# Patient Record
Sex: FEMALE | Race: BLACK OR AFRICAN AMERICAN | Employment: FULL TIME | URBAN - METROPOLITAN AREA
[De-identification: names, ages, dates, MRNs, and addresses within clinical notes are randomized per-mention and may not be internally consistent; named-entity substitution may affect disease eponyms.]

---

## 2018-06-01 ENCOUNTER — HOSPITAL ENCOUNTER (EMERGENCY)
Age: 33
Discharge: HOME | End: 2018-06-01
Attending: EMERGENCY MEDICINE | Admitting: EMERGENCY MEDICINE
Payer: SELF-PAY

## 2018-06-01 ENCOUNTER — APPOINTMENT (OUTPATIENT)
Dept: ULTRASOUND IMAGING | Age: 33
End: 2018-06-01
Attending: EMERGENCY MEDICINE
Payer: SELF-PAY

## 2018-06-01 VITALS
OXYGEN SATURATION: 99 % | SYSTOLIC BLOOD PRESSURE: 116 MMHG | RESPIRATION RATE: 18 BRPM | HEART RATE: 65 BPM | DIASTOLIC BLOOD PRESSURE: 69 MMHG | WEIGHT: 186 LBS | BODY MASS INDEX: 25.19 KG/M2 | HEIGHT: 72 IN | TEMPERATURE: 98.5 F

## 2018-06-01 DIAGNOSIS — Z32.01 PREGNANCY TEST PERFORMED, PREGNANCY CONFIRMED: Primary | ICD-10-CM

## 2018-06-01 DIAGNOSIS — R10.2 PELVIC CRAMPING: ICD-10-CM

## 2018-06-01 LAB
ABO + RH BLD: NORMAL
ANION GAP SERPL CALC-SCNC: 7 MMOL/L (ref 3–18)
APPEARANCE UR: CLEAR
BASOPHILS # BLD: 0 K/UL (ref 0–0.06)
BASOPHILS NFR BLD: 0 % (ref 0–2)
BILIRUB UR QL: NEGATIVE
BUN SERPL-MCNC: 16 MG/DL (ref 7–18)
BUN/CREAT SERPL: 23 (ref 12–20)
C TRACH RRNA SPEC QL NAA+PROBE: NEGATIVE
CALCIUM SERPL-MCNC: 9.9 MG/DL (ref 8.5–10.1)
CHLORIDE SERPL-SCNC: 108 MMOL/L (ref 100–108)
CO2 SERPL-SCNC: 23 MMOL/L (ref 21–32)
COLOR UR: YELLOW
CREAT SERPL-MCNC: 0.71 MG/DL (ref 0.6–1.3)
DIFFERENTIAL METHOD BLD: ABNORMAL
EOSINOPHIL # BLD: 0.1 K/UL (ref 0–0.4)
EOSINOPHIL NFR BLD: 2 % (ref 0–5)
ERYTHROCYTE [DISTWIDTH] IN BLOOD BY AUTOMATED COUNT: 12.6 % (ref 11.6–14.5)
GLUCOSE SERPL-MCNC: 93 MG/DL (ref 74–99)
GLUCOSE UR STRIP.AUTO-MCNC: NEGATIVE MG/DL
HCG SERPL-ACNC: ABNORMAL MIU/ML (ref 0–10)
HCG UR QL: POSITIVE
HCT VFR BLD AUTO: 37.8 % (ref 35–45)
HGB BLD-MCNC: 12.2 G/DL (ref 12–16)
HGB UR QL STRIP: NEGATIVE
KETONES UR QL STRIP.AUTO: NEGATIVE MG/DL
LEUKOCYTE ESTERASE UR QL STRIP.AUTO: NEGATIVE
LYMPHOCYTES # BLD: 2.8 K/UL (ref 0.9–3.6)
LYMPHOCYTES NFR BLD: 47 % (ref 21–52)
MCH RBC QN AUTO: 29.9 PG (ref 24–34)
MCHC RBC AUTO-ENTMCNC: 32.3 G/DL (ref 31–37)
MCV RBC AUTO: 92.6 FL (ref 74–97)
MONOCYTES # BLD: 0.4 K/UL (ref 0.05–1.2)
MONOCYTES NFR BLD: 7 % (ref 3–10)
N GONORRHOEA RRNA SPEC QL NAA+PROBE: NEGATIVE
NEUTS SEG # BLD: 2.6 K/UL (ref 1.8–8)
NEUTS SEG NFR BLD: 44 % (ref 40–73)
NITRITE UR QL STRIP.AUTO: NEGATIVE
PH UR STRIP: 5.5 [PH] (ref 5–8)
PLATELET # BLD AUTO: 262 K/UL (ref 135–420)
PMV BLD AUTO: 10.7 FL (ref 9.2–11.8)
POTASSIUM SERPL-SCNC: 4.2 MMOL/L (ref 3.5–5.5)
PROT UR STRIP-MCNC: NEGATIVE MG/DL
RBC # BLD AUTO: 4.08 M/UL (ref 4.2–5.3)
SERVICE CMNT-IMP: NORMAL
SODIUM SERPL-SCNC: 138 MMOL/L (ref 136–145)
SP GR UR REFRACTOMETRY: 1.03 (ref 1–1.03)
SPECIMEN SOURCE: NORMAL
TSH SERPL DL<=0.05 MIU/L-ACNC: 1.04 UIU/ML (ref 0.36–3.74)
UROBILINOGEN UR QL STRIP.AUTO: 0.2 EU/DL (ref 0.2–1)
WBC # BLD AUTO: 6 K/UL (ref 4.6–13.2)
WET PREP GENITAL: NORMAL

## 2018-06-01 PROCEDURE — 81003 URINALYSIS AUTO W/O SCOPE: CPT

## 2018-06-01 PROCEDURE — 87491 CHLMYD TRACH DNA AMP PROBE: CPT

## 2018-06-01 PROCEDURE — 96360 HYDRATION IV INFUSION INIT: CPT

## 2018-06-01 PROCEDURE — 84702 CHORIONIC GONADOTROPIN TEST: CPT

## 2018-06-01 PROCEDURE — 74011250636 HC RX REV CODE- 250/636: Performed by: EMERGENCY MEDICINE

## 2018-06-01 PROCEDURE — 84443 ASSAY THYROID STIM HORMONE: CPT

## 2018-06-01 PROCEDURE — 81025 URINE PREGNANCY TEST: CPT

## 2018-06-01 PROCEDURE — 86900 BLOOD TYPING SEROLOGIC ABO: CPT

## 2018-06-01 PROCEDURE — 76817 TRANSVAGINAL US OBSTETRIC: CPT

## 2018-06-01 PROCEDURE — 99285 EMERGENCY DEPT VISIT HI MDM: CPT

## 2018-06-01 PROCEDURE — 80048 BASIC METABOLIC PNL TOTAL CA: CPT

## 2018-06-01 PROCEDURE — 85025 COMPLETE CBC W/AUTO DIFF WBC: CPT

## 2018-06-01 PROCEDURE — 87210 SMEAR WET MOUNT SALINE/INK: CPT

## 2018-06-01 RX ORDER — IBUPROFEN 400 MG/1
800 TABLET ORAL
Status: DISCONTINUED | OUTPATIENT
Start: 2018-06-01 | End: 2018-06-01

## 2018-06-01 RX ADMIN — SODIUM CHLORIDE 1000 ML: 900 INJECTION, SOLUTION INTRAVENOUS at 07:00

## 2018-06-01 NOTE — DISCHARGE INSTRUCTIONS
Your hormone level today was 82061. You need to have your level recheck on Monday 06/04/2018 at your [facility] office or in the ER. Belly Pain in Pregnancy: Care Instructions  Your Care Instructions    When you're pregnant, any belly pain can be a worry. You may not want to call your doctor about every pain you have. But you don't want to miss something that is dangerous for you or your baby. Even if it feels familiar, belly pain can mean something new when you're pregnant. It's important to know when to call your doctor. It will also help to know how to care for yourself at home when your pain is not caused by anything harmful. · When belly pain is more severe or constant, see a doctor right away. · If you're sure your belly pain is a sign of labor, call your doctor. · When belly pain is brief, it's usually a normal part of pregnancy. It might be related to changes in the growing uterus. Or it could be the stretching of ligaments called round ligaments. These ligaments help support the uterus. Round ligament pain can be on either side of your belly. It can also be felt in your hips or groin. Follow-up care is a key part of your treatment and safety. Be sure to make and go to all appointments, and call your doctor if you are having problems. It's also a good idea to know your test results and keep a list of the medicines you take. How can you tell if belly pain is a sign of labor? When belly pain is caused by labor, it can feel like mild or menstrual-like cramps in your lower belly. These cramps are probably contractions. They can happen in your second or third trimester. You may also have:  · A steady, dull ache in your lower back, pelvis, or thighs. · A feeling of pressure in your pelvis or lower belly. · Changes in your vaginal discharge or a sudden release of fluid from the vagina. If you think you are in labor, call your doctor. How can you care for yourself at home?   When belly pain is mild and is not a symptom of labor:  · Rest until you feel better. · Take a warm bath. · Think about what you drink and eat:  ¨ Drink plenty of fluids. Choose water and other caffeine-free clear liquids until you feel better. ¨ Try eating small, frequent meals. If your stomach is upset, try bland, low-fat foods like plain rice, broiled chicken, toast, and yogurt. · Think about how you move if you are having brief pains from stretching of the round ligaments. ¨ Try gentle stretching. ¨ Move a little more slowly when turning in bed or getting up from a chair, so those ligaments don't stretch quickly. ¨ Lean forward a bit if you think you are going to cough or sneeze. When should you call for help? Call 911 anytime you think you may need emergency care. For example, call if:  ? · You have sudden, severe pain in your belly. ? · You have severe vaginal bleeding. ?Call your doctor now or seek immediate medical care if:  ? · You have new or worse belly pain or cramping. ? · You have any vaginal bleeding. ? · You have a fever. ? · You have symptoms of preeclampsia, such as:  ¨ Sudden swelling of your face, hands, or feet. ¨ New vision problems (such as dimness or blurring). ¨ A severe headache. ? · You think that you may be in labor. This means that you've had at least 8 contractions within 1 hour or at least 4 contractions within 20 minutes, even after you change your position and drink fluids. ? · You have symptoms of a urinary tract infection. These may include:  ¨ Pain or burning when you urinate. ¨ A frequent need to urinate without being able to pass much urine. ¨ Pain in the flank, which is just below the rib cage and above the waist on either side of the back. ¨ Blood in your urine. ? Watch closely for changes in your health, and be sure to contact your doctor if you are worried about your or your baby's health. Where can you learn more? Go to http://annabel.info/.   Enter B275 in the search box to learn more about \"Belly Pain in Pregnancy: Care Instructions. \"  Current as of: March 16, 2017  Content Version: 11.4  © 7620-9989 [employer]. Care instructions adapted under license by [employer] (which disclaims liability or warranty for this information). If you have questions about a medical condition or this instruction, always ask your healthcare professional. [place] [address] any warranty or liability for your use of this information.

## 2018-06-01 NOTE — ED PROVIDER NOTES
EMERGENCY DEPARTMENT HISTORY AND PHYSICAL EXAM    6:27 AM      Date: 6/1/2018  Patient Name: Chasity Boyce    History of Presenting Illness     Chief Complaint   Patient presents with    Dizziness         History Provided By: Patient    Chief Complaint: Headache  Duration: 3 Days  Timing:  Acute  Location: Middle of head  Quality: Aching  Severity: Moderate  Modifying Factors: Patient did not report. Associated Symptoms: hot flashes, diaphoresis, abd cramps      Additional History (Context): 6:30 AM Chasity Boyce is a 34 y.o. female with no pertinent PMHx who presents to ED complaining of a moderate acute aching headache in the middle of head with onset 3 days ago with associated sx of hot flashes, diaphoresis, and abd cramps. Pt states she \"doesn't feel right\" and \"my body doesn't feel well. \" Denies congestion, pressure, sore throat, drainage, and cough. . States her period is irregular but her last period was APR 24th. Denies N/V/D and dysuria. 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 Admits to taking tylenol yesterday for the pain. Reports she takes no other medications. Denies any SHx. Denies smoking, illicit drug use, and ETOH use. Pt has no PCP but goes to the [facility] clinic. PCP: Ajay Reyna MD        Past History     Past Medical History:  History reviewed. No pertinent past medical history. Past Surgical History:  History reviewed. No pertinent surgical history. Family History:  History reviewed. No pertinent family history. Social History:  Social History   Substance Use Topics    Smoking status: Never Smoker    Smokeless tobacco: Never Used    Alcohol use No       Allergies:  No Known Allergies      Review of Systems     Review of Systems   Constitutional: Positive for diaphoresis. Negative for chills. Positive for hot flashes   HENT: Positive for congestion. Negative for postnasal drip, sinus pressure and sore throat.     Respiratory: Negative for cough and shortness of breath. Cardiovascular: Negative for chest pain. Gastrointestinal: Positive for abdominal pain ( cramps). Negative for diarrhea, nausea and vomiting. Genitourinary: Negative for dysuria and vaginal bleeding. Musculoskeletal: Negative for back pain. Skin: Negative for rash. Neurological: Positive for headaches. Negative for dizziness. All other systems reviewed and are negative. Physical Exam     Visit Vitals    /69 (BP 1 Location: Left arm, BP Patient Position: At rest)    Pulse 65    Temp 98.5 °F (36.9 °C)    Resp 18    Ht 6' 2\" (1.88 m)    Wt 84.4 kg (186 lb)    LMP 04/26/2018    SpO2 99%    Breastfeeding No    BMI 23.88 kg/m2       Physical Exam    General Exam: Patient is well developed and well nourished in no distress. Patient does not appear acutely ill or toxic. Eye Exam: Lids and conjunctiva are normal  ENT Exam: The general head and facial exam is normal.  The neck is supple without meningeal signs. No significant adenopathy. Pulmonary Exam: No respiratory distress. The respiratory rate is normal.  No stridor. The breath sounds are equal bilaterally. There are no wheezes, rales, or rhonchi noted. Cardiac Exam: The cardiac rate and rhythm are normal.  No significant murmurs, rubs, or gallops. The peripheral pulses are normal.  Abdominal Exam: Abdomen is soft and non-distended. No pulsatile masses. There is no local tenderness. There is no rebound or guarding noted. Skin and Soft Tissue: The skin is warm and dry, without significant abnormality. Good color. Musculoskeletal Exam: There is no clubbing or cyanosis. There is no peripheral edema. The musculoskeletal exam of the lower extremities is normal without significant local tenderness or spasm. Neurologic: Pt is alert and appropriate. Normal speech. Normal symmetric muscle strength and tone in all extremities. Normal gait.   Psychiatric: Normal adult with appropriate demeanor and interpersonal interaction. Is oriented to person, place, and time. : Chaperone was Alvarez. Normal external exam. Small amount of white discharge. No vaginal bleeding. No CMT or cervical tenderness. Diagnostic Study Results     Labs -  Recent Results (from the past 12 hour(s))   URINALYSIS W/ RFLX MICROSCOPIC    Collection Time: 06/01/18  6:30 AM   Result Value Ref Range    Color YELLOW      Appearance CLEAR      Specific gravity 1.029 1.005 - 1.030      pH (UA) 5.5 5.0 - 8.0      Protein NEGATIVE  NEG mg/dL    Glucose NEGATIVE  NEG mg/dL    Ketone NEGATIVE  NEG mg/dL    Bilirubin NEGATIVE  NEG      Blood NEGATIVE  NEG      Urobilinogen 0.2 0.2 - 1.0 EU/dL    Nitrites NEGATIVE  NEG      Leukocyte Esterase NEGATIVE  NEG     HCG URINE, QL. - POC    Collection Time: 06/01/18  6:31 AM   Result Value Ref Range    Pregnancy test,urine (POC) POSITIVE (A) NEG     CBC WITH AUTOMATED DIFF    Collection Time: 06/01/18  6:46 AM   Result Value Ref Range    WBC 6.0 4.6 - 13.2 K/uL    RBC 4.08 (L) 4.20 - 5.30 M/uL    HGB 12.2 12.0 - 16.0 g/dL    HCT 37.8 35.0 - 45.0 %    MCV 92.6 74.0 - 97.0 FL    MCH 29.9 24.0 - 34.0 PG    MCHC 32.3 31.0 - 37.0 g/dL    RDW 12.6 11.6 - 14.5 %    PLATELET 779 681 - 785 K/uL    MPV 10.7 9.2 - 11.8 FL    NEUTROPHILS 44 40 - 73 %    LYMPHOCYTES 47 21 - 52 %    MONOCYTES 7 3 - 10 %    EOSINOPHILS 2 0 - 5 %    BASOPHILS 0 0 - 2 %    ABS. NEUTROPHILS 2.6 1.8 - 8.0 K/UL    ABS. LYMPHOCYTES 2.8 0.9 - 3.6 K/UL    ABS. MONOCYTES 0.4 0.05 - 1.2 K/UL    ABS. EOSINOPHILS 0.1 0.0 - 0.4 K/UL    ABS.  BASOPHILS 0.0 0.0 - 0.06 K/UL    DF AUTOMATED     BLOOD TYPE, (ABO+RH)    Collection Time: 06/01/18  6:46 AM   Result Value Ref Range    ABO/Rh(D) A POSITIVE    METABOLIC PANEL, BASIC    Collection Time: 06/01/18  6:46 AM   Result Value Ref Range    Sodium 138 136 - 145 mmol/L    Potassium 4.2 3.5 - 5.5 mmol/L    Chloride 108 100 - 108 mmol/L    CO2 23 21 - 32 mmol/L    Anion gap 7 3.0 - 18 mmol/L    Glucose 93 74 - 99 mg/dL    BUN 16 7.0 - 18 MG/DL    Creatinine 0.71 0.6 - 1.3 MG/DL    BUN/Creatinine ratio 23 (H) 12 - 20      GFR est AA >60 >60 ml/min/1.73m2    GFR est non-AA >60 >60 ml/min/1.73m2    Calcium 9.9 8.5 - 10.1 MG/DL   BETA HCG, QT    Collection Time: 06/01/18  6:46 AM   Result Value [place] [address] (H) 0 - 10 MIU/ML   TSH 3RD GENERATION    Collection Time: 06/01/18  6:46 AM   Result Value Ref Range    TSH 1.04 0.36 - 3.74 uIU/mL   WET PREP    Collection Time: 06/01/18  8:05 AM   Result Value Ref Range    Special Requests: NO SPECIAL REQUESTS      Wet prep NO YEAST,TRICHOMONAS OR CLUE CELLS NOTED         Radiologic Studies -   US UTS TRANSVAGINAL OB   Final Result   IMPRESSION:     1. Somewhat elongated hypoechoic structure within the uterus, potentially  reflective of an early gestational sac; however, no fetal pole or yolk sac is  demonstrated. Would recommend continued clinical follow-up, trending of beta hCG  levels, and repeat ultrasound to document viable intrauterine pregnancy as  appropriate clinically.     2. Normal blood flow to each ovary.     3. Possible right ovarian corpus luteum.                  Medical Decision Making   I am the first provider for this patient. I reviewed the vital signs, available nursing notes, past medical history, past surgical history, family history and social history. Vital Signs-Reviewed the patient's vital signs. Pulse Oximetry Analysis -  99% on room air - stable    Records Reviewed: Nursing Notes and Old Medical Records (Time of Review: 6:27 AM)    ED Course: Progress Notes, Reevaluation, and Consults:  Consult:  Discussed care with Dr. Milan (OBGYN). Standard discussion; including history of patients chief complaint, available diagnostic results, and treatment course.  Reviewed the finding and in light of the pt's quant, feels that the pt is safe for outpatient follow up and can have a repeat blood test at the office Monday. 9:31 AM, 6/1/2018       Provider Notes (Medical Decision Making): Pt with vague complaints of not feeling well, exam reassuring with normal vitals. Reports of lower abd cramping yesterday. POC preg +. Labs and U/S obtained. Labs reviewed with no significant abnormalities. Pelvic with no tenderness and no exam finding to suggest ruptured ectopic. U/S with possible early gestational sac. Given level of quant, discussed with OB as fetal pole and yolk sac not yet seen. Pt to have close follow up with OB for repeat quant on Monday. Also advised to return to ER at anytime with any worsening or concerning symptoms. Pt ambulatory in ED and eager for discharge. Diagnosis     Clinical Impression:     ICD-10-CM ICD-9-CM   1. Pregnancy test performed, pregnancy confirmed Z32.01 V72.42   2. Pelvic cramping R10.2 625.9         Disposition: Discharged. Follow-up Information     None           Patient's Medications   Start Taking    No medications on file   Continue Taking    No medications on file   These Medications have changed    No medications on file   Stop Taking    METHOCARBAMOL (ROBAXIN-750) 750 MG TABLET    Take 1 Tab by mouth four (4) times daily as needed. _______________________________    Attestations:  Scribe Attestation     Carlos Waldron and Deb Mccann acting as a scribe for and in the presence of Юлия Black MD      June 01, 2018 at 6:27 AM       Provider Attestation:      I personally performed the services described in the documentation, reviewed the documentation, as recorded by the scribe in my presence, and it accurately and completely records my words and actions.  June 01, 2018 at 6:27 AM - Юлия Black MD    _______________________________

## 2018-06-01 NOTE — ED NOTES
Pt states she has been having off and on sweats, dizziness and headaches and states she has been getting very tired and SOB with exertion.   Pt also reports lower abdominal cramping

## 2018-06-01 NOTE — ED NOTES
Verbal shift change report given to Supriya Callahan RN (oncoming nurse) by Tej Bhatia (offgoing nurse). Report included the following information SBAR, ED Summary and MAR.

## 2018-06-05 ENCOUNTER — APPOINTMENT (OUTPATIENT)
Dept: ULTRASOUND IMAGING | Age: 33
End: 2018-06-05
Attending: EMERGENCY MEDICINE
Payer: SELF-PAY

## 2018-06-05 ENCOUNTER — HOSPITAL ENCOUNTER (EMERGENCY)
Age: 33
Discharge: HOME | End: 2018-06-05
Attending: EMERGENCY MEDICINE
Payer: SELF-PAY

## 2018-06-05 VITALS
HEART RATE: 65 BPM | TEMPERATURE: 98.1 F | OXYGEN SATURATION: 100 % | HEIGHT: 72 IN | RESPIRATION RATE: 16 BRPM | SYSTOLIC BLOOD PRESSURE: 128 MMHG | DIASTOLIC BLOOD PRESSURE: 78 MMHG | BODY MASS INDEX: 25.19 KG/M2 | WEIGHT: 186 LBS

## 2018-06-05 DIAGNOSIS — O26.899 PELVIC PAIN IN PREGNANCY: Primary | ICD-10-CM

## 2018-06-05 DIAGNOSIS — R10.9 ABDOMINAL PAIN COMPLICATING PREGNANCY: ICD-10-CM

## 2018-06-05 DIAGNOSIS — R10.2 PELVIC PAIN IN PREGNANCY: Primary | ICD-10-CM

## 2018-06-05 DIAGNOSIS — O26.899 ABDOMINAL PAIN COMPLICATING PREGNANCY: ICD-10-CM

## 2018-06-05 LAB
ALBUMIN SERPL-MCNC: 3.7 G/DL (ref 3.4–5)
ALBUMIN/GLOB SERPL: 1.2 {RATIO} (ref 0.8–1.7)
ALP SERPL-CCNC: 47 U/L (ref 45–117)
ALT SERPL-CCNC: 19 U/L (ref 13–56)
ANION GAP SERPL CALC-SCNC: 7 MMOL/L (ref 3–18)
APPEARANCE UR: CLEAR
AST SERPL-CCNC: 14 U/L (ref 15–37)
BASOPHILS # BLD: 0 K/UL (ref 0–0.06)
BASOPHILS NFR BLD: 0 % (ref 0–2)
BILIRUB DIRECT SERPL-MCNC: 0.1 MG/DL (ref 0–0.2)
BILIRUB SERPL-MCNC: 0.2 MG/DL (ref 0.2–1)
BILIRUB UR QL: NEGATIVE
BUN SERPL-MCNC: 17 MG/DL (ref 7–18)
BUN/CREAT SERPL: 25 (ref 12–20)
CALCIUM SERPL-MCNC: 9.6 MG/DL (ref 8.5–10.1)
CHLORIDE SERPL-SCNC: 108 MMOL/L (ref 100–108)
CO2 SERPL-SCNC: 24 MMOL/L (ref 21–32)
COLOR UR: YELLOW
CREAT SERPL-MCNC: 0.69 MG/DL (ref 0.6–1.3)
DIFFERENTIAL METHOD BLD: ABNORMAL
EOSINOPHIL # BLD: 0.1 K/UL (ref 0–0.4)
EOSINOPHIL NFR BLD: 2 % (ref 0–5)
ERYTHROCYTE [DISTWIDTH] IN BLOOD BY AUTOMATED COUNT: 12.1 % (ref 11.6–14.5)
GLOBULIN SER CALC-MCNC: 3.2 G/DL (ref 2–4)
GLUCOSE SERPL-MCNC: 86 MG/DL (ref 74–99)
GLUCOSE UR STRIP.AUTO-MCNC: NEGATIVE MG/DL
HCG SERPL-ACNC: ABNORMAL MIU/ML (ref 0–10)
HCT VFR BLD AUTO: 34.8 % (ref 35–45)
HGB BLD-MCNC: 11.3 G/DL (ref 12–16)
HGB UR QL STRIP: NEGATIVE
KETONES UR QL STRIP.AUTO: NEGATIVE MG/DL
LEUKOCYTE ESTERASE UR QL STRIP.AUTO: NEGATIVE
LIPASE SERPL-CCNC: 189 U/L (ref 73–393)
LYMPHOCYTES # BLD: 1.8 K/UL (ref 0.9–3.6)
LYMPHOCYTES NFR BLD: 39 % (ref 21–52)
MCH RBC QN AUTO: 29.6 PG (ref 24–34)
MCHC RBC AUTO-ENTMCNC: 32.5 G/DL (ref 31–37)
MCV RBC AUTO: 91.1 FL (ref 74–97)
MONOCYTES # BLD: 0.5 K/UL (ref 0.05–1.2)
MONOCYTES NFR BLD: 10 % (ref 3–10)
NEUTS SEG # BLD: 2.3 K/UL (ref 1.8–8)
NEUTS SEG NFR BLD: 49 % (ref 40–73)
NITRITE UR QL STRIP.AUTO: NEGATIVE
PH UR STRIP: 5 [PH] (ref 5–8)
PLATELET # BLD AUTO: 262 K/UL (ref 135–420)
PMV BLD AUTO: 11 FL (ref 9.2–11.8)
POTASSIUM SERPL-SCNC: 4.3 MMOL/L (ref 3.5–5.5)
PROT SERPL-MCNC: 6.9 G/DL (ref 6.4–8.2)
PROT UR STRIP-MCNC: NEGATIVE MG/DL
RBC # BLD AUTO: 3.82 M/UL (ref 4.2–5.3)
SODIUM SERPL-SCNC: 139 MMOL/L (ref 136–145)
SP GR UR REFRACTOMETRY: >1.03 (ref 1–1.03)
UROBILINOGEN UR QL STRIP.AUTO: 0.2 EU/DL (ref 0.2–1)
WBC # BLD AUTO: 4.7 K/UL (ref 4.6–13.2)

## 2018-06-05 PROCEDURE — 80076 HEPATIC FUNCTION PANEL: CPT

## 2018-06-05 PROCEDURE — 81003 URINALYSIS AUTO W/O SCOPE: CPT

## 2018-06-05 PROCEDURE — 76817 TRANSVAGINAL US OBSTETRIC: CPT

## 2018-06-05 PROCEDURE — 80048 BASIC METABOLIC PNL TOTAL CA: CPT

## 2018-06-05 PROCEDURE — 99283 EMERGENCY DEPT VISIT LOW MDM: CPT

## 2018-06-05 PROCEDURE — 84702 CHORIONIC GONADOTROPIN TEST: CPT

## 2018-06-05 PROCEDURE — 85025 COMPLETE CBC W/AUTO DIFF WBC: CPT

## 2018-06-05 PROCEDURE — 83690 ASSAY OF LIPASE: CPT

## 2018-06-05 NOTE — ED NOTES
Vitals:  Patient Vitals for the past 12 hrs:   Temp Pulse Resp BP SpO2   06/05/18 1945 - - - 128/78 100 %   06/05/18 1844 - 65 16 121/76 100 %   06/05/18 1729 98.1 °F (36.7 °C) 63 16 125/73 100 %         Medications ordered:   Medications - No data to display      Lab findings:  Recent Results (from the past 12 hour(s))   URINALYSIS W/ RFLX MICROSCOPIC    Collection Time: 06/05/18  5:26 PM   Result Value Ref Range    Color YELLOW      Appearance CLEAR      Specific gravity >1.030 (H) 1.005 - 1.030    pH (UA) 5.0 5.0 - 8.0      Protein NEGATIVE  NEG mg/dL    Glucose NEGATIVE  NEG mg/dL    Ketone NEGATIVE  NEG mg/dL    Bilirubin NEGATIVE  NEG      Blood NEGATIVE  NEG      Urobilinogen 0.2 0.2 - 1.0 EU/dL    Nitrites NEGATIVE  NEG      Leukocyte Esterase NEGATIVE  NEG     BETA HCG, QT    Collection Time: 06/05/18  6:30 PM   Result Value Ref [place] [address] (H) 0 - 10 MIU/ML   CBC WITH AUTOMATED DIFF    Collection Time: 06/05/18  6:30 PM   Result Value Ref Range    WBC 4.7 4.6 - 13.2 K/uL    RBC 3.82 (L) 4.20 - 5.30 M/uL    HGB 11.3 (L) 12.0 - 16.0 g/dL    HCT 34.8 (L) 35.0 - 45.0 %    MCV 91.1 74.0 - 97.0 FL    MCH 29.6 24.0 - 34.0 PG    MCHC 32.5 31.0 - 37.0 g/dL    RDW 12.1 11.6 - 14.5 %    PLATELET 269 571 - 964 K/uL    MPV 11.0 9.2 - 11.8 FL    NEUTROPHILS 49 40 - 73 %    LYMPHOCYTES 39 21 - 52 %    MONOCYTES 10 3 - 10 %    EOSINOPHILS 2 0 - 5 %    BASOPHILS 0 0 - 2 %    ABS. NEUTROPHILS 2.3 1.8 - 8.0 K/UL    ABS. LYMPHOCYTES 1.8 0.9 - 3.6 K/UL    ABS. MONOCYTES 0.5 0.05 - 1.2 K/UL    ABS. EOSINOPHILS 0.1 0.0 - 0.4 K/UL    ABS.  BASOPHILS 0.0 0.0 - 0.06 K/UL    DF AUTOMATED     METABOLIC PANEL, BASIC    Collection Time: 06/05/18  6:30 PM   Result Value Ref Range    Sodium 139 136 - 145 mmol/L    Potassium 4.3 3.5 - 5.5 mmol/L    Chloride 108 100 - 108 mmol/L    CO2 24 21 - 32 mmol/L    Anion gap 7 3.0 - 18 mmol/L    Glucose 86 74 - 99 mg/dL    BUN 17 7.0 - 18 MG/DL    Creatinine 0.69 0.6 - 1.3 MG/DL BUN/Creatinine ratio 25 (H) 12 - 20      GFR est AA >60 >60 ml/min/1.73m2    GFR est non-AA >60 >60 ml/min/1.73m2    Calcium 9.6 8.5 - 10.1 MG/DL   HEPATIC FUNCTION PANEL    Collection Time: 06/05/18  6:30 PM   Result Value Ref Range    Protein, total 6.9 6.4 - 8.2 g/dL    Albumin 3.7 3.4 - 5.0 g/dL    Globulin 3.2 2.0 - 4.0 g/dL    A-G Ratio 1.2 0.8 - 1.7      Bilirubin, total 0.2 0.2 - 1.0 MG/DL    Bilirubin, direct 0.1 0.0 - 0.2 MG/DL    Alk. phosphatase 47 45 - 117 U/L    AST (SGOT) 14 (L) 15 - 37 U/L    ALT (SGPT) 19 13 - 56 U/L   LIPASE    Collection Time: 06/05/18  6:30 PM   Result Value Ref Range    Lipase 189 73 - 393 U/L       EKG interpretation by ED Physician:      X-Ray, CT or other radiology findings or impressions:  US UTS TRANSVAGINAL OB    (Results Pending)   now with yolk sac c/w previous; no fetal pole    Progress notes, Consult notes or additional Procedure notes:   7:18 PM :Pt care assumed from Dr. Janis Read , ED provider. Pt complaint(s), current treatment plan, progression and available diagnostic results have been discussed thoroughly. Rounding occurred: yes  Intended Disposition: Home   Pending diagnostic reports and/or labs (please list): us  Increased quant and now has fetal pole; c/w iup, appropriate pregnancy progression  I have discussed with patient and/or family/sig other the results, interpretation of any imaging if performed, suspected diagnosis and treatment plan to include instructions regarding the diagnoses listed to which understanding was expressed with all questions answered      Reevaluation of patient:   stable    Disposition:  Diagnosis:   1. Pelvic pain in pregnancy    2.  Abdominal pain complicating pregnancy        Disposition: home    Follow-up Information     Follow up With Details Comments [address] DO Rodri Schedule an appointment as soon as possible for a visit  [address] EMERGENCY DEPT  If symptoms worsen [address]  [phone]            Discharge Medication List as of 6/5/2018  7:50 PM      CONTINUE these medications which have NOT CHANGED    Details   prenatal multivit-ca-min-fe-fa (PRENATAL VITAMIN) tab Take 1 Tab by mouth daily. , Print, Disp-30 Tab, R-0

## 2018-06-05 NOTE — ED PROVIDER NOTES
EMERGENCY DEPARTMENT HISTORY AND PHYSICAL EXAM    6:09 PM      Date: 6/5/2018  Patient Name: Best Greene    History of Presenting Illness     Chief Complaint   Patient presents with    Pregnancy Problem       History Provided By: Patient    Chief Complaint: Abdominal Pain   Duration:  1 day ago  Timing:  Constant  Location: Epigastric abdomen   Quality: Cramping and Sharp  Severity: 8 out of 10  Modifying Factors: None   Associated Symptoms: An associated symptom includes back pain. Patient denies other associated symptoms, such as vaginal bleeding, N/V/D, CP, and SOB. Additional History (Context): Best Greene is a 34 y.o. female with No significant past medical history who presents with sharp, cramping epigastric abdominal pain onset 1 day ago. An associated symptom includes back pain. Patient denies other associated symptoms, such as vaginal bleeding, N/V/D, CP, and SOB. Patient states that she was seen here on 6/1/18. Patient had a positive pregnancy test. Patient's ultrasound showed a gestational sac, but was unable to visualize other signs of pregnancy. The recommendation was to follow-up with the OB/GYN for a repeat hormone level. Patient denies following up with the OB/GYN secondary to insurance reasons. Patient reports currently taking Prenatal vitamins. Reports LNMP: 4/26/18. No other concerns were expressed at this time. Blood: A +    PCP: Ajay Reyna MD    Current Outpatient Prescriptions   Medication Sig Dispense Refill    prenatal multivit-ca-min-fe-fa (PRENATAL VITAMIN) tab Take 1 Tab by mouth daily. 30 Tab 0       Past History     Past Medical History:  History reviewed. No pertinent past medical history. Past Surgical History:  History reviewed. No pertinent surgical history. Family History:  History reviewed. No pertinent family history.     Social History:  Social History   Substance Use Topics    Smoking status: Never Smoker    Smokeless tobacco: Never Used    Alcohol use No       Allergies:  No Known Allergies      Review of Systems       Review of Systems   Constitutional: Negative for chills. HENT: Negative for congestion and sore throat. Respiratory: Negative for cough and shortness of breath. Cardiovascular: Negative for chest pain. Gastrointestinal: Positive for abdominal pain. Negative for diarrhea, nausea and vomiting. Genitourinary: Negative for dysuria and vaginal bleeding. Musculoskeletal: Positive for back pain. Skin: Negative for rash. Neurological: Negative for dizziness and headaches. All other systems reviewed and are negative. Physical Exam     Visit Vitals    /76    Pulse 65    Temp 98.1 °F (36.7 °C)    Resp 16    Ht 6' 2\" (1.88 m)    Wt 84.4 kg (186 lb)    LMP 04/26/2018    SpO2 100%    BMI 23.88 kg/m2       Physical Exam  General Exam: Patient is a well developed and well nourished in no distress. Patient does not appear acutely ill or toxic. Eye Exam: Lids and conjunctiva are normal  ENT Exam: The general head and facial exam is normal.  The neck is supple without meningeal signs. No significant adenopathy. Pulmonary Exam: No respiratory distress. The respiratory rate is normal.  No stridor. The breath sounds are equal bilaterally. There are no wheezes, rales, or rhonchi noted. Cardiac Exam: The cardiac rate and rhythm are normal.  No significant murmurs, rubs, or gallops. The peripheral pulses are normal.  Abdominal Exam: Abdomen is soft and non-distended. No pulsatile masses. There is mild mid-abdominal tenderness. There is no rebound or guarding noted. Skin and Soft Tissue: The skin is warm and dry, without significant abnormality. Good color. Musculoskeletal Exam: No peripheral edema. The musculoskeletal exam of the lower extremities is normal without significant local tenderness. Psychiatric: Normal adult with appropriate demeanor and interpersonal interaction.   Is oriented to person, place, and time.      Diagnostic Study Results     Labs -  No results found for this or any previous visit (from the past 12 hour(s)). Radiologic Studies -   US UTS TRANSVAGINAL OB    (Results Pending)         Medical Decision Making   I am the first provider for this patient. I reviewed the vital signs, available nursing notes, past medical history, past surgical history, family history and social history. Vital Signs-Reviewed the patient's vital signs. Pulse Oximetry Analysis -  100% on room air (Interpretation)    Records Reviewed: Nursing Notes, Old Medical Records and Triage notes  (Time of Review: 6:09 PM)    ED Course: Progress Notes, Reevaluation, and Consults:  7:00 PM : Pt care transferred to Dr. Dipti Grey  ,ED provider. History of patient complaint(s), available diagnostic reports and current treatment plan has been discussed thoroughly. Bedside rounding on patient occured : yes . Intended disposition of patient : TBD  Pending diagnostics reports and/or labs (please list): Pending Labs and Ultrasound Results     Provider Notes (Medical Decision Making): Pt with abdominal cramping in early pregnancy. Abdomen soft without peritoneal signs. Resting comfortably in ED. Repeat labs obtained including hcg quant (pending). Plan for repeat U/S given lack of fetal pole/yolk sac seen on previous U/S. Labs and U/S pending. Pt to be signed out to Dr. Dipti Grey. Diagnosis     Clinical Impression:     ICD-10-CM ICD-9-CM   1. Pelvic pain in pregnancy O26.899 646.80    R10.2 625.9         Disposition: Signed out to Dr. Dipti Grey, ED Provider    Follow-up Information     None           Patient's Medications   Start Taking    No medications on file   Continue Taking    PRENATAL MULTIVIT-CA-MIN-FE-FA (PRENATAL VITAMIN) TAB    Take 1 Tab by mouth daily.    These Medications have changed    No medications on file   Stop Taking    No medications on file     _______________________________    Attestations:  Varsha Attestation     [employer] acting as a scribe for and in the presence of Emanuel Puckett MD      June 05, 2018 at 6:09 PM       Provider Attestation:      I personally performed the services described in the documentation, reviewed the documentation, as recorded by the scribe in my presence, and it accurately and completely records my words and actions.  June 05, 2018 at 6:09 PM - Emanuel Puckett MD    _______________________________

## 2018-06-05 NOTE — ED TRIAGE NOTES
Seen here 2 days ago. Cramping. Found was 5 weeks pregnant. Says waiting for medicaid so can go to OB.

## 2018-09-17 ENCOUNTER — HOSPITAL ENCOUNTER (EMERGENCY)
Age: 33
Discharge: HOME | End: 2018-09-17
Attending: EMERGENCY MEDICINE
Payer: MEDICAID

## 2018-09-17 ENCOUNTER — HOSPITAL ENCOUNTER (OUTPATIENT)
Age: 33
Discharge: HOME | End: 2018-09-17
Attending: OBSTETRICS & GYNECOLOGY | Admitting: OBSTETRICS & GYNECOLOGY
Payer: MEDICAID

## 2018-09-17 VITALS
OXYGEN SATURATION: 100 % | RESPIRATION RATE: 18 BRPM | HEART RATE: 85 BPM | SYSTOLIC BLOOD PRESSURE: 142 MMHG | DIASTOLIC BLOOD PRESSURE: 82 MMHG | TEMPERATURE: 98.5 F

## 2018-09-17 VITALS — HEIGHT: 72 IN | BODY MASS INDEX: 25.19 KG/M2 | WEIGHT: 186 LBS | TEMPERATURE: 98.8 F

## 2018-09-17 DIAGNOSIS — Z01.419 WELL WOMAN EXAM: Primary | ICD-10-CM
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
## 2018-09-17 PROBLEM — Z34.90 PREGNANCY: Status: ACTIVE | Noted: 2018-09-17

## 2018-09-17 PROCEDURE — 99282 EMERGENCY DEPT VISIT SF MDM: CPT

## 2018-09-17 PROCEDURE — 99281 EMR DPT VST MAYX REQ PHY/QHP: CPT

## 2018-09-17 PROCEDURE — 99218 HC RM OBSERVATION: CPT

## 2018-09-17 NOTE — PROGRESS NOTES
21 weeks pregnant, 34 y.o. female with no pertinent pmhx seen and discharged from  ED for c/o evaluation of moderate acute fall down 9 steps. Pt feels okay currently and she is 21 weeks pregnant. Patient  Denies vaginal  bleeding or discharge, reports feeling some pain in lower midline abd. Patient placed on TOCO monitor for uterine activity, fetal heart tones obtained via doppler, . Patient oriented to room, given juice, call bell in place. [phone] Paged Dr Jayson Snellen [phone] received return call from Dr. Jayson Snellen advised to monitor patient until able to assess. Ifrah to Dr. Jayson Snellen via telephone, patient cleared to be discharged home follow up with regular OB provider.

## 2018-09-17 NOTE — PROGRESS NOTES
Labor and delivery screening visit Phone Triage Visit Name: Venessa Roe MRN: [id number]  SSN: [id number] YOB: 1985  Age: 34 y.o. Sex: female Reji Westbrook is a 34 y.o.  female who is due 2019, by Last Menstrual Period. She is  20w4d. She  is being seen for s/p fall down 9 steps at work today. She was cleared by the ED and sent to L&D for monitoring. Pt. Has been monitored for 2 hours. She denies any pain, bleeding or cramping. She denies any abdominal trauma and fell on her bottom and hands. Chief Complaint Patient presents with  Fall OB History  Para Term  AB Living 4 1  1 SAB TAB Ectopic Molar Multiple Live Births # Outcome Date GA Lbr Juan Antonio/2nd Weight Sex Delivery Anes PTL Lv  
4 Current           
3  08/11/15   6 lb (2.722 kg) F  None 2  07/16/10   4 lb 10 oz (2.098 kg) M Vag-Spont EPI N   
1  08   6 lb 11 oz (3.033 kg) M Vag-Spont EPI N She is being seen in screening location [facility] Patient denies LOF/VB/DFM Additional Diagnoses: Present on Admission: **None** Historical Additional  Problem List.:  
Problem List as of 2018  Never Reviewed Codes Class Noted - Resolved Pregnancy ICD-10-CM: Z34.90 ICD-9-CM: V22.2  2018 - Present No Known Allergies No past medical history on file. Prior to Admission medications Medication Sig Start Date End Date Taking? Authorizing Provider  
prenatal multivit-ca-min-fe-fa (PRENATAL VITAMIN) tab Take 1 Tab by mouth daily. 18  Yes Herrera Lehman MD  
  
 
Objective: 
Visit Vitals  Temp 98.8 °F (37.1 °C)  Ht 6' 2\" (1.88 m)  Wt 186 lb (84.4 kg)  BMI 23.88 kg/m2 Prenatal Labs:  
No results found for: RUBELLAEXT, GRBSEXT, HBSAGEXT, HIVEXT, RPREXT, GONNOEXT, CHLAMEXT 
 
WBC Date/Time Value Ref Range Status 2018 06:30 PM 4.7 4.6 - 13.2 K/uL Final  
 2018 06:46 AM 6.0 4.6 - 13.2 K/uL Final  
 
HGB Date/Time Value Ref Range Status 2018 06:30 PM 11.3 (L) 12.0 - 16.0 g/dL Final  
2018 06:46 AM 12.2 12.0 - 16.0 g/dL Final  
 
PLATELET Date/Time Value Ref Range Status 2018 06:30  135 - 420 K/uL Final  
 
 
Labs:  No results found for this or any previous visit (from the past 24 hour(s)). Fetal Heart Rate: REMOTELY REVIEWED BY PHYSICIAN Fetal Non-stress Test: Reactive [place]:  none Estimated Date of Delivery: 19 OB History  Para Term  AB Living 4 1  1 SAB TAB Ectopic Molar Multiple Live Births Physical Exam: Per nursing assessment Patient without distress HEENT: No obvious head trauma, she can hear at a conversational level, vision is adequate, and no choking or difficulty swallowing. Neurologically: She oriented to time and place as well as understands her situation and give a good medical history. Abdomen: benign, gravid, nontender, no CVA tenderness; fundal size appropriate to said gestational age Pelvic: External genitalia normal without inflammation, lesions or abnormal discharge Extremities: negative for clubbing, cyanosis or edema CervicalExam:not examined Impression/Plan:  
 
Plan:  21 weeks pregnant- s/p fall at work with no abdominal trauma. Pt. Has been monitored for almost 2 hours. Fetal heart tones and vitals appropriate. Stable from an obstetric perspective. F/U with primary OB in several days. Abruption precautions. Signed By:  Josue Huerta MD   
 2018

## 2018-09-17 NOTE — DISCHARGE INSTRUCTIONS

## 2018-09-17 NOTE — ED PROVIDER NOTES
EMERGENCY DEPARTMENT HISTORY AND PHYSICAL EXAM 
 
4:51 PM 
 
 
Date: 9/17/2018 Patient Name: Dawson Camacho History of Presenting Illness Chief Complaint Patient presents with  Fall History Provided By: Patient Chief Complaint: Thalia Broody Duration: few Minutes pta Timing:  Acute Location: n/a Quality: n/a Severity: Moderate Modifying Factors: No modifying or aggravating factors were reported. Associated Symptoms: denies any other associated signs or symptoms Additional History (Context):Gale Ramirez is a 34 y.o. female with no pertinent pmhx who presents to the emergency department for evaluation of moderate acute fall down 9 steps with onset minutes pta. Pt feels okay currently and she is 21 weeks pregnant. She denies bleeding or current abd pain. Did have some abd pain right after the incident, but this quickly resolved. She denies hitting her head or LOC. No modifying or aggravating factors were reported. No other concerns or symptoms at this time. PCP:  Ajay Reyna MD 
 
 
 
Past History Past Medical History: 
History reviewed. No pertinent past medical history. Past Surgical History: 
History reviewed. No pertinent surgical history. Family History: 
History reviewed. No pertinent family history. Social History: 
Social History Substance Use Topics  Smoking status: Never Smoker  Smokeless tobacco: Never Used  Alcohol use No  
 
 
Allergies: 
No Known Allergies Review of Systems Review of Systems Constitutional: Negative for chills and fever. HENT: Negative for congestion, rhinorrhea and sore throat. Eyes: Negative for visual disturbance. Respiratory: Negative for cough and shortness of breath. Cardiovascular: Negative for chest pain and palpitations. Gastrointestinal: Negative for abdominal pain, nausea and vomiting. Musculoskeletal: Negative for back pain and myalgias. Skin: Negative. Allergic/Immunologic: Negative. Neurological: Negative for headaches. Physical Exam  
 
Visit Vitals  /82  Pulse 85  Temp 98.5 °F (36.9 °C)  Resp 18  LMP 04/26/2018  SpO2 100% Physical Exam  
Constitutional: She is oriented to person, place, and time. She appears well-developed and well-nourished. No distress. HENT:  
Head: Normocephalic and atraumatic. Eyes: Conjunctivae and EOM are normal. Pupils are equal, round, and reactive to light. Right eye exhibits no discharge. Left eye exhibits no discharge. Neck: Normal range of motion. Neck supple. No thyromegaly present. Cardiovascular: Normal rate, regular rhythm and normal heart sounds. Exam reveals no gallop and no friction rub. No murmur heard. Pulmonary/Chest: Effort normal and breath sounds normal. No respiratory distress. She has no wheezes. She has no rales. She exhibits no tenderness. Abdominal: Soft. Bowel sounds are normal. She exhibits no distension. There is no tenderness. There is no rebound and no guarding. Musculoskeletal: She exhibits no edema or deformity. Lymphadenopathy:  
  She has no cervical adenopathy. Neurological: She is alert and oriented to person, place, and time. She has normal reflexes. Skin: Skin is warm and dry. No rash noted. She is not diaphoretic. Psychiatric: She has a normal mood and affect. Nursing note and vitals reviewed. Medical Decision Making I am the first provider for this patient. I reviewed the vital signs, available nursing notes, past medical history, past surgical history, family history and social history. Vital Signs-Reviewed the patient's vital signs. Pulse Oximetry Analysis -  100% on room air - stable Records Reviewed: Nursing Notes (Time of Review: 4:51 PM) 
 
ED Course: Progress Notes, Reevaluation, and Consults: 
 
Provider Notes (Medical Decision Making):  
 Pt presents in NAD, vitals wnl. She denies any complaints. She states her job sent her here for evaluation as she is pregnant. No VB, discharge, abdominal pain, back pain, chest pain. There is no indication to do any work-up here. Will DC to L&D for evaluation of fetus. At this time, patient is stable and appropriate for discharge home. Patient demonstrates understanding of current diagnoses and is in agreement with the treatment plan. They are advised that while the likelihood of serious underlying condition is low at this point given the evaluation performed today, we cannot fully rule it out. They are advised to immediately return with any new symptoms or worsening of current condition. All questions have been answered. Patient is given educational material regarding their diagnoses, including danger symptoms and when to return to the ED. Diagnosis Clinical Impression:  
1. Well woman exam   
2. Fall, initial encounter Disposition: discharged Follow-up Information Follow up With Details Comments Contact Info Phys Other, MD Call in 2 days As needed Patient can only remember the practice name and not the physician [facility] EMERGENCY DEPT Go to As needed, If symptoms worsen [address] 
[phone] Discharge Medication List as of 9/17/2018  5:03 PM  
  
CONTINUE these medications which have NOT CHANGED Details  
prenatal multivit-ca-min-fe-fa (PRENATAL VITAMIN) tab Take 1 Tab by mouth daily. , Print, Disp-30 Tab, R-0  
  
  
 
_______________________________ Scribe Attestation Yamilex Medrano PA-C acting as a scribe for and in the presence of Radha Woodall September 17, 2018 at 5:21 PM 
    
Provider Attestation:     
I personally performed the services described in the documentation, reviewed the documentation, as recorded by the scribe in my presence, and it accurately and completely records my words and actions.  September 17, 2018 at 5:21 PM - Radha Baldwin

## 2023-10-30 NOTE — ED NOTES
I have reviewed discharge instructions with the patient. The patient verbalized understanding. Patient armband removed and shredded What Type Of Note Output Would You Prefer (Optional)?: Standard Output What Is The Reason For Today's Visit?: Annual Full Body Skin Examination with No Concerns What Is The Reason For Today's Visit? (Being Monitored For X): the re-examination of lesions previously examined